# Patient Record
Sex: FEMALE | Race: ASIAN | NOT HISPANIC OR LATINO | URBAN - METROPOLITAN AREA
[De-identification: names, ages, dates, MRNs, and addresses within clinical notes are randomized per-mention and may not be internally consistent; named-entity substitution may affect disease eponyms.]

---
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
## 2018-01-19 ENCOUNTER — OFFICE VISIT - HEALTHEAST (OUTPATIENT)
Dept: FAMILY MEDICINE | Facility: CLINIC | Age: 18
End: 2018-01-19

## 2018-01-19 DIAGNOSIS — Z11.1 SCREENING EXAMINATION FOR PULMONARY TUBERCULOSIS: ICD-10-CM

## 2018-01-19 ASSESSMENT — MIFFLIN-ST. JEOR: SCORE: 1141.62

## 2018-01-22 LAB
QTF INTERPRETATION: NORMAL
QTF MITOGEN - NIL: >10 IU/ML
QTF NIL: 0.12 IU/ML
QTF RESULT: NEGATIVE
QTF TB ANTIGEN - NIL: 0.05 IU/ML

## 2021-05-31 VITALS — WEIGHT: 103.19 LBS | BODY MASS INDEX: 20.26 KG/M2 | HEIGHT: 60 IN

## 2021-06-15 NOTE — PROGRESS NOTES
"ASSESMENT AND PLAN:  Diagnoses and all orders for this visit:    Screening examination for pulmonary tuberculosis  -     QTF-Mycobacterium tuberculosis by [facility]  Will send result to her employer when available.       SUBJECTIVE: Tway T Say is here to get TB test for work.  She applied to work as a PCA.  She had negative TB Gold [facility] test in 2013.  No known exposure to active TB.  Denied chronic cough, night sweat or weight loss.  She is not taking any daily medications currently.    No past medical history on file.  Patient Active Problem List   Diagnosis     Dyspepsia     Bipolar 2 disorder, major depressive episode       Allergies:  No Known Allergies    History   Smoking Status     Never Smoker   Smokeless Tobacco     Never Used       Review of systems otherwise negative except as listed in HPI.   History   Smoking Status     Never Smoker   Smokeless Tobacco     Never Used       OBJECTICE: BP 94/56 (Patient Site: Right Arm, Patient Position: Sitting, Cuff Size: Adult Regular)  Pulse 88  Temp 98.3  F (36.8  C) (Oral)   Resp 16  Ht 4' 11.5\" (1.511 m)  Wt 103 lb 3 oz (46.8 kg)  LMP 01/05/2018 (Within Days)  BMI 20.49 kg/m2    DATA REVIEWED:    Labs Reviewed or Ordered (1):       GEN-alert,  in no apparent distress.  HEENT-mucous membranes are moist, neck is supple.  CV-regular rate and rhythm with no murmur.   RESP-lungs clear to auscultation .  alex Noyola   1/19/2018     "